# Patient Record
Sex: MALE | Race: WHITE | NOT HISPANIC OR LATINO | Employment: OTHER | URBAN - NONMETROPOLITAN AREA
[De-identification: names, ages, dates, MRNs, and addresses within clinical notes are randomized per-mention and may not be internally consistent; named-entity substitution may affect disease eponyms.]

---

## 2019-11-29 ENCOUNTER — APPOINTMENT (OUTPATIENT)
Dept: GENERAL RADIOLOGY | Facility: HOSPITAL | Age: 54
End: 2019-11-29

## 2019-11-29 ENCOUNTER — HOSPITAL ENCOUNTER (INPATIENT)
Facility: HOSPITAL | Age: 54
LOS: 2 days | Discharge: HOME | End: 2019-12-01
Attending: EMERGENCY MEDICINE | Admitting: INTERNAL MEDICINE

## 2019-11-29 DIAGNOSIS — I21.3 ST ELEVATION MYOCARDIAL INFARCTION (STEMI), UNSPECIFIED ARTERY (HCC): Primary | ICD-10-CM

## 2019-11-29 DIAGNOSIS — I21.02 ST ELEVATION MYOCARDIAL INFARCTION INVOLVING LEFT ANTERIOR DESCENDING (LAD) CORONARY ARTERY (HCC): ICD-10-CM

## 2019-11-29 LAB
ACT BLD: 122 SECONDS (ref 82–152)
ACT BLD: 78 SECONDS (ref 82–152)
ALBUMIN SERPL-MCNC: 4.6 G/DL (ref 3.5–5.2)
ALBUMIN/GLOB SERPL: 1.4 G/DL
ALP SERPL-CCNC: 101 U/L (ref 39–117)
ALT SERPL W P-5'-P-CCNC: 13 U/L (ref 1–41)
ANION GAP SERPL CALCULATED.3IONS-SCNC: 11 MMOL/L (ref 5–15)
ANION GAP SERPL CALCULATED.3IONS-SCNC: 16 MMOL/L (ref 5–15)
AST SERPL-CCNC: 21 U/L (ref 1–40)
BASOPHILS # BLD AUTO: 0.06 10*3/MM3 (ref 0–0.2)
BASOPHILS NFR BLD AUTO: 0.6 % (ref 0–1.5)
BILIRUB SERPL-MCNC: 0.6 MG/DL (ref 0.2–1.2)
BUN BLD-MCNC: 12 MG/DL (ref 6–20)
BUN BLD-MCNC: 13 MG/DL (ref 6–20)
BUN/CREAT SERPL: 11.3 (ref 7–25)
BUN/CREAT SERPL: 12.7 (ref 7–25)
CALCIUM SPEC-SCNC: 10 MG/DL (ref 8.6–10.5)
CALCIUM SPEC-SCNC: 9.3 MG/DL (ref 8.6–10.5)
CHLORIDE SERPL-SCNC: 98 MMOL/L (ref 98–107)
CHLORIDE SERPL-SCNC: 99 MMOL/L (ref 98–107)
CHOLEST SERPL-MCNC: 205 MG/DL (ref 0–200)
CO2 SERPL-SCNC: 24 MMOL/L (ref 22–29)
CO2 SERPL-SCNC: 27 MMOL/L (ref 22–29)
CREAT BLD-MCNC: 1.02 MG/DL (ref 0.76–1.27)
CREAT BLD-MCNC: 1.06 MG/DL (ref 0.76–1.27)
DEPRECATED RDW RBC AUTO: 39.6 FL (ref 37–54)
DEPRECATED RDW RBC AUTO: 40 FL (ref 37–54)
EOSINOPHIL # BLD AUTO: 0.24 10*3/MM3 (ref 0–0.4)
EOSINOPHIL NFR BLD AUTO: 2.3 % (ref 0.3–6.2)
ERYTHROCYTE [DISTWIDTH] IN BLOOD BY AUTOMATED COUNT: 12 % (ref 12.3–15.4)
ERYTHROCYTE [DISTWIDTH] IN BLOOD BY AUTOMATED COUNT: 12.1 % (ref 12.3–15.4)
GFR SERPL CREATININE-BSD FRML MDRD: 73 ML/MIN/1.73
GFR SERPL CREATININE-BSD FRML MDRD: 76 ML/MIN/1.73
GLOBULIN UR ELPH-MCNC: 3.3 GM/DL
GLUCOSE BLD-MCNC: 102 MG/DL (ref 65–99)
GLUCOSE BLD-MCNC: 110 MG/DL (ref 65–99)
HBA1C MFR BLD: 5.6 % (ref 4.8–5.6)
HCT VFR BLD AUTO: 41 % (ref 37.5–51)
HCT VFR BLD AUTO: 45.7 % (ref 37.5–51)
HDLC SERPL-MCNC: 51 MG/DL (ref 40–60)
HGB BLD-MCNC: 14.2 G/DL (ref 13–17.7)
HGB BLD-MCNC: 15.9 G/DL (ref 13–17.7)
HOLD SPECIMEN: NORMAL
HOLD SPECIMEN: NORMAL
IMM GRANULOCYTES # BLD AUTO: 0.04 10*3/MM3 (ref 0–0.05)
IMM GRANULOCYTES NFR BLD AUTO: 0.4 % (ref 0–0.5)
LDLC SERPL CALC-MCNC: 139 MG/DL (ref 0–100)
LDLC/HDLC SERPL: 2.73 {RATIO}
LYMPHOCYTES # BLD AUTO: 4.96 10*3/MM3 (ref 0.7–3.1)
LYMPHOCYTES NFR BLD AUTO: 47.3 % (ref 19.6–45.3)
MCH RBC QN AUTO: 31 PG (ref 26.6–33)
MCH RBC QN AUTO: 31.2 PG (ref 26.6–33)
MCHC RBC AUTO-ENTMCNC: 34.6 G/DL (ref 31.5–35.7)
MCHC RBC AUTO-ENTMCNC: 34.8 G/DL (ref 31.5–35.7)
MCV RBC AUTO: 89.5 FL (ref 79–97)
MCV RBC AUTO: 89.8 FL (ref 79–97)
MONOCYTES # BLD AUTO: 0.87 10*3/MM3 (ref 0.1–0.9)
MONOCYTES NFR BLD AUTO: 8.3 % (ref 5–12)
NEUTROPHILS # BLD AUTO: 4.32 10*3/MM3 (ref 1.7–7)
NEUTROPHILS NFR BLD AUTO: 41.1 % (ref 42.7–76)
NRBC BLD AUTO-RTO: 0 /100 WBC (ref 0–0.2)
NT-PROBNP SERPL-MCNC: 32.3 PG/ML (ref 5–900)
PLATELET # BLD AUTO: 213 10*3/MM3 (ref 140–450)
PLATELET # BLD AUTO: 261 10*3/MM3 (ref 140–450)
PMV BLD AUTO: 9.4 FL (ref 6–12)
PMV BLD AUTO: 9.4 FL (ref 6–12)
POTASSIUM BLD-SCNC: 4 MMOL/L (ref 3.5–5.2)
POTASSIUM BLD-SCNC: 4.1 MMOL/L (ref 3.5–5.2)
PROT SERPL-MCNC: 7.9 G/DL (ref 6–8.5)
RBC # BLD AUTO: 4.58 10*6/MM3 (ref 4.14–5.8)
RBC # BLD AUTO: 5.09 10*6/MM3 (ref 4.14–5.8)
SODIUM BLD-SCNC: 136 MMOL/L (ref 136–145)
SODIUM BLD-SCNC: 139 MMOL/L (ref 136–145)
TRIGL SERPL-MCNC: 73 MG/DL (ref 0–150)
TROPONIN T SERPL-MCNC: 0.18 NG/ML (ref 0–0.03)
TROPONIN T SERPL-MCNC: <0.01 NG/ML (ref 0–0.03)
VLDLC SERPL-MCNC: 14.6 MG/DL
WBC NRBC COR # BLD: 10.49 10*3/MM3 (ref 3.4–10.8)
WBC NRBC COR # BLD: 12.33 10*3/MM3 (ref 3.4–10.8)
WHOLE BLOOD HOLD SPECIMEN: NORMAL
WHOLE BLOOD HOLD SPECIMEN: NORMAL

## 2019-11-29 PROCEDURE — B2111ZZ FLUOROSCOPY OF MULTIPLE CORONARY ARTERIES USING LOW OSMOLAR CONTRAST: ICD-10-PCS | Performed by: INTERNAL MEDICINE

## 2019-11-29 PROCEDURE — 99284 EMERGENCY DEPT VISIT MOD MDM: CPT

## 2019-11-29 PROCEDURE — 93010 ELECTROCARDIOGRAM REPORT: CPT | Performed by: INTERNAL MEDICINE

## 2019-11-29 PROCEDURE — 85025 COMPLETE CBC W/AUTO DIFF WBC: CPT | Performed by: EMERGENCY MEDICINE

## 2019-11-29 PROCEDURE — 92941 PRQ TRLML REVSC TOT OCCL AMI: CPT | Performed by: INTERNAL MEDICINE

## 2019-11-29 PROCEDURE — C9606 PERC D-E COR REVASC W AMI S: HCPCS | Performed by: INTERNAL MEDICINE

## 2019-11-29 PROCEDURE — 36415 COLL VENOUS BLD VENIPUNCTURE: CPT | Performed by: EMERGENCY MEDICINE

## 2019-11-29 PROCEDURE — 0 IOPAMIDOL PER 1 ML: Performed by: INTERNAL MEDICINE

## 2019-11-29 PROCEDURE — 84484 ASSAY OF TROPONIN QUANT: CPT | Performed by: EMERGENCY MEDICINE

## 2019-11-29 PROCEDURE — 83036 HEMOGLOBIN GLYCOSYLATED A1C: CPT | Performed by: INTERNAL MEDICINE

## 2019-11-29 PROCEDURE — C1769 GUIDE WIRE: HCPCS | Performed by: INTERNAL MEDICINE

## 2019-11-29 PROCEDURE — 25010000002 BIVALIRUDIN TRIFLUOROACETATE 250 MG RECONSTITUTED SOLUTION 1 EACH VIAL: Performed by: INTERNAL MEDICINE

## 2019-11-29 PROCEDURE — 4A023N7 MEASUREMENT OF CARDIAC SAMPLING AND PRESSURE, LEFT HEART, PERCUTANEOUS APPROACH: ICD-10-PCS | Performed by: INTERNAL MEDICINE

## 2019-11-29 PROCEDURE — 71045 X-RAY EXAM CHEST 1 VIEW: CPT

## 2019-11-29 PROCEDURE — C1874 STENT, COATED/COV W/DEL SYS: HCPCS | Performed by: INTERNAL MEDICINE

## 2019-11-29 PROCEDURE — 80053 COMPREHEN METABOLIC PANEL: CPT | Performed by: EMERGENCY MEDICINE

## 2019-11-29 PROCEDURE — 80061 LIPID PANEL: CPT | Performed by: INTERNAL MEDICINE

## 2019-11-29 PROCEDURE — 93458 L HRT ARTERY/VENTRICLE ANGIO: CPT | Performed by: INTERNAL MEDICINE

## 2019-11-29 PROCEDURE — 027034Z DILATION OF CORONARY ARTERY, ONE ARTERY WITH DRUG-ELUTING INTRALUMINAL DEVICE, PERCUTANEOUS APPROACH: ICD-10-PCS | Performed by: INTERNAL MEDICINE

## 2019-11-29 PROCEDURE — 83880 ASSAY OF NATRIURETIC PEPTIDE: CPT | Performed by: EMERGENCY MEDICINE

## 2019-11-29 PROCEDURE — 85347 COAGULATION TIME ACTIVATED: CPT

## 2019-11-29 PROCEDURE — 85027 COMPLETE CBC AUTOMATED: CPT | Performed by: INTERNAL MEDICINE

## 2019-11-29 PROCEDURE — 93005 ELECTROCARDIOGRAM TRACING: CPT | Performed by: INTERNAL MEDICINE

## 2019-11-29 PROCEDURE — C1887 CATHETER, GUIDING: HCPCS | Performed by: INTERNAL MEDICINE

## 2019-11-29 PROCEDURE — C1894 INTRO/SHEATH, NON-LASER: HCPCS | Performed by: INTERNAL MEDICINE

## 2019-11-29 PROCEDURE — 93005 ELECTROCARDIOGRAM TRACING: CPT | Performed by: EMERGENCY MEDICINE

## 2019-11-29 PROCEDURE — 99223 1ST HOSP IP/OBS HIGH 75: CPT | Performed by: INTERNAL MEDICINE

## 2019-11-29 PROCEDURE — 80048 BASIC METABOLIC PNL TOTAL CA: CPT | Performed by: INTERNAL MEDICINE

## 2019-11-29 PROCEDURE — C1725 CATH, TRANSLUMIN NON-LASER: HCPCS | Performed by: INTERNAL MEDICINE

## 2019-11-29 PROCEDURE — B2151ZZ FLUOROSCOPY OF LEFT HEART USING LOW OSMOLAR CONTRAST: ICD-10-PCS | Performed by: INTERNAL MEDICINE

## 2019-11-29 DEVICE — XIENCE SIERRA™ EVEROLIMUS ELUTING CORONARY STENT SYSTEM 3.25 MM X 33 MM / RAPID-EXCHANGE
Type: IMPLANTABLE DEVICE | Site: CORONARY | Status: FUNCTIONAL
Brand: XIENCE SIERRA™

## 2019-11-29 RX ORDER — PRASUGREL 10 MG/1
TABLET, FILM COATED ORAL AS NEEDED
Status: DISCONTINUED | OUTPATIENT
Start: 2019-11-29 | End: 2019-11-29 | Stop reason: HOSPADM

## 2019-11-29 RX ORDER — NITROGLYCERIN 20 MG/100ML
10-50 INJECTION INTRAVENOUS
Status: DISCONTINUED | OUTPATIENT
Start: 2019-11-29 | End: 2019-11-29

## 2019-11-29 RX ORDER — CARVEDILOL 3.12 MG/1
3.12 TABLET ORAL EVERY 12 HOURS SCHEDULED
Status: DISCONTINUED | OUTPATIENT
Start: 2019-11-29 | End: 2019-11-30

## 2019-11-29 RX ORDER — ONDANSETRON 2 MG/ML
4 INJECTION INTRAMUSCULAR; INTRAVENOUS EVERY 6 HOURS PRN
Status: DISCONTINUED | OUTPATIENT
Start: 2019-11-29 | End: 2019-12-01 | Stop reason: HOSPADM

## 2019-11-29 RX ORDER — ATORVASTATIN CALCIUM 40 MG/1
40 TABLET, FILM COATED ORAL NIGHTLY
Status: DISCONTINUED | OUTPATIENT
Start: 2019-11-29 | End: 2019-12-01 | Stop reason: HOSPADM

## 2019-11-29 RX ORDER — ASPIRIN 325 MG
325 TABLET ORAL ONCE
Status: COMPLETED | OUTPATIENT
Start: 2019-11-29 | End: 2019-11-29

## 2019-11-29 RX ORDER — NITROGLYCERIN 5 MG/ML
INJECTION, SOLUTION INTRAVENOUS AS NEEDED
Status: DISCONTINUED | OUTPATIENT
Start: 2019-11-29 | End: 2019-11-29 | Stop reason: HOSPADM

## 2019-11-29 RX ORDER — FAMOTIDINE 20 MG/1
20 TABLET, FILM COATED ORAL
Status: DISCONTINUED | OUTPATIENT
Start: 2019-11-29 | End: 2019-12-01

## 2019-11-29 RX ORDER — SODIUM CHLORIDE 9 MG/ML
250 INJECTION, SOLUTION INTRAVENOUS ONCE AS NEEDED
Status: DISCONTINUED | OUTPATIENT
Start: 2019-11-29 | End: 2019-12-01 | Stop reason: HOSPADM

## 2019-11-29 RX ORDER — SODIUM CHLORIDE 0.9 % (FLUSH) 0.9 %
10 SYRINGE (ML) INJECTION AS NEEDED
Status: DISCONTINUED | OUTPATIENT
Start: 2019-11-29 | End: 2019-12-01 | Stop reason: HOSPADM

## 2019-11-29 RX ORDER — ASPIRIN 81 MG/1
81 TABLET, CHEWABLE ORAL DAILY
Status: DISCONTINUED | OUTPATIENT
Start: 2019-11-30 | End: 2019-12-01 | Stop reason: HOSPADM

## 2019-11-29 RX ORDER — LOSARTAN POTASSIUM 25 MG/1
25 TABLET ORAL DAILY
Status: DISCONTINUED | OUTPATIENT
Start: 2019-11-29 | End: 2019-12-01

## 2019-11-29 RX ORDER — PRASUGREL 10 MG/1
10 TABLET, FILM COATED ORAL DAILY
Status: DISCONTINUED | OUTPATIENT
Start: 2019-11-30 | End: 2019-12-01 | Stop reason: HOSPADM

## 2019-11-29 RX ORDER — SODIUM CHLORIDE 9 MG/ML
125 INJECTION, SOLUTION INTRAVENOUS CONTINUOUS
Status: ACTIVE | OUTPATIENT
Start: 2019-11-29 | End: 2019-11-29

## 2019-11-29 RX ORDER — ONDANSETRON 4 MG/1
4 TABLET, FILM COATED ORAL EVERY 6 HOURS PRN
Status: DISCONTINUED | OUTPATIENT
Start: 2019-11-29 | End: 2019-12-01 | Stop reason: HOSPADM

## 2019-11-29 RX ORDER — LIDOCAINE HYDROCHLORIDE 20 MG/ML
INJECTION, SOLUTION INFILTRATION; PERINEURAL AS NEEDED
Status: DISCONTINUED | OUTPATIENT
Start: 2019-11-29 | End: 2019-11-29 | Stop reason: HOSPADM

## 2019-11-29 RX ORDER — ACETAMINOPHEN 325 MG/1
650 TABLET ORAL EVERY 4 HOURS PRN
Status: DISCONTINUED | OUTPATIENT
Start: 2019-11-29 | End: 2019-12-01 | Stop reason: HOSPADM

## 2019-11-29 RX ADMIN — FAMOTIDINE 20 MG: 20 TABLET ORAL at 18:04

## 2019-11-29 RX ADMIN — Medication 10 ML: at 03:51

## 2019-11-29 RX ADMIN — CARVEDILOL 3.12 MG: 3.12 TABLET, FILM COATED ORAL at 20:39

## 2019-11-29 RX ADMIN — SODIUM CHLORIDE 125 ML/HR: 9 INJECTION, SOLUTION INTRAVENOUS at 06:11

## 2019-11-29 RX ADMIN — CARVEDILOL 3.12 MG: 3.12 TABLET, FILM COATED ORAL at 08:06

## 2019-11-29 RX ADMIN — FAMOTIDINE 20 MG: 20 TABLET ORAL at 06:33

## 2019-11-29 RX ADMIN — ASPIRIN 325 MG: 325 TABLET, COATED ORAL at 03:50

## 2019-11-29 RX ADMIN — LOSARTAN POTASSIUM 25 MG: 25 TABLET, FILM COATED ORAL at 08:06

## 2019-11-29 RX ADMIN — NITROGLYCERIN 10 MCG/MIN: 20 INJECTION INTRAVENOUS at 04:06

## 2019-11-29 RX ADMIN — SODIUM CHLORIDE 1000 ML: 900 INJECTION, SOLUTION INTRAVENOUS at 03:52

## 2019-11-29 RX ADMIN — Medication 12.5 MG: at 11:47

## 2019-11-29 RX ADMIN — ATORVASTATIN CALCIUM 40 MG: 40 TABLET, FILM COATED ORAL at 20:39

## 2019-11-30 ENCOUNTER — APPOINTMENT (OUTPATIENT)
Dept: CARDIOLOGY | Facility: HOSPITAL | Age: 54
End: 2019-11-30

## 2019-11-30 LAB
BH CV ECHO MEAS - ACS: 2.3 CM
BH CV ECHO MEAS - AO MAX PG (FULL): 1.3 MMHG
BH CV ECHO MEAS - AO MAX PG: 3.6 MMHG
BH CV ECHO MEAS - AO MEAN PG (FULL): 0 MMHG
BH CV ECHO MEAS - AO MEAN PG: 2 MMHG
BH CV ECHO MEAS - AO ROOT AREA (BSA CORRECTED): 1.8
BH CV ECHO MEAS - AO ROOT AREA: 9.1 CM^2
BH CV ECHO MEAS - AO ROOT DIAM: 3.4 CM
BH CV ECHO MEAS - AO V2 MAX: 95.4 CM/SEC
BH CV ECHO MEAS - AO V2 MEAN: 74.5 CM/SEC
BH CV ECHO MEAS - AO V2 VTI: 17.2 CM
BH CV ECHO MEAS - AVA(I,A): 3.5 CM^2
BH CV ECHO MEAS - AVA(I,D): 3.5 CM^2
BH CV ECHO MEAS - AVA(V,A): 3.3 CM^2
BH CV ECHO MEAS - AVA(V,D): 3.3 CM^2
BH CV ECHO MEAS - BSA(HAYCOCK): 1.9 M^2
BH CV ECHO MEAS - BSA: 1.9 M^2
BH CV ECHO MEAS - BZI_BMI: 23.7 KILOGRAMS/M^2
BH CV ECHO MEAS - BZI_METRIC_HEIGHT: 177.8 CM
BH CV ECHO MEAS - BZI_METRIC_WEIGHT: 74.8 KG
BH CV ECHO MEAS - EDV(CUBED): 89.3 ML
BH CV ECHO MEAS - EDV(MOD-SP2): 58.7 ML
BH CV ECHO MEAS - EDV(MOD-SP4): 78.6 ML
BH CV ECHO MEAS - EDV(TEICH): 91 ML
BH CV ECHO MEAS - EF(CUBED): 37.6 %
BH CV ECHO MEAS - EF(MOD-SP2): 36.8 %
BH CV ECHO MEAS - EF(MOD-SP4): 37.8 %
BH CV ECHO MEAS - EF(TEICH): 31.1 %
BH CV ECHO MEAS - ESV(CUBED): 55.7 ML
BH CV ECHO MEAS - ESV(MOD-SP2): 37.1 ML
BH CV ECHO MEAS - ESV(MOD-SP4): 48.9 ML
BH CV ECHO MEAS - ESV(TEICH): 62.7 ML
BH CV ECHO MEAS - FS: 14.5 %
BH CV ECHO MEAS - IVS/LVPW: 0.95
BH CV ECHO MEAS - IVSD: 0.9 CM
BH CV ECHO MEAS - LA DIMENSION: 2.9 CM
BH CV ECHO MEAS - LA/AO: 0.85
BH CV ECHO MEAS - LV DIASTOLIC VOL/BSA (35-75): 40.9 ML/M^2
BH CV ECHO MEAS - LV MASS(C)D: 135.9 GRAMS
BH CV ECHO MEAS - LV MASS(C)DI: 70.7 GRAMS/M^2
BH CV ECHO MEAS - LV MAX PG: 2.4 MMHG
BH CV ECHO MEAS - LV MEAN PG: 2 MMHG
BH CV ECHO MEAS - LV SYSTOLIC VOL/BSA (12-30): 25.4 ML/M^2
BH CV ECHO MEAS - LV V1 MAX: 76.7 CM/SEC
BH CV ECHO MEAS - LV V1 MEAN: 57.2 CM/SEC
BH CV ECHO MEAS - LV V1 VTI: 14.6 CM
BH CV ECHO MEAS - LVIDD: 4.5 CM
BH CV ECHO MEAS - LVIDS: 3.8 CM
BH CV ECHO MEAS - LVLD AP2: 7.3 CM
BH CV ECHO MEAS - LVLD AP4: 7.3 CM
BH CV ECHO MEAS - LVLS AP2: 6.8 CM
BH CV ECHO MEAS - LVLS AP4: 6.9 CM
BH CV ECHO MEAS - LVOT AREA (M): 4.2 CM^2
BH CV ECHO MEAS - LVOT AREA: 4.2 CM^2
BH CV ECHO MEAS - LVOT DIAM: 2.3 CM
BH CV ECHO MEAS - LVPWD: 0.95 CM
BH CV ECHO MEAS - MV A MAX VEL: 46.3 CM/SEC
BH CV ECHO MEAS - MV DEC SLOPE: 326 CM/SEC^2
BH CV ECHO MEAS - MV E MAX VEL: 47.3 CM/SEC
BH CV ECHO MEAS - MV E/A: 1
BH CV ECHO MEAS - MV P1/2T MAX VEL: 65.8 CM/SEC
BH CV ECHO MEAS - MV P1/2T: 59.1 MSEC
BH CV ECHO MEAS - MVA P1/2T LCG: 3.3 CM^2
BH CV ECHO MEAS - MVA(P1/2T): 3.7 CM^2
BH CV ECHO MEAS - PA MAX PG (FULL): 0.77 MMHG
BH CV ECHO MEAS - PA MAX PG: 2.9 MMHG
BH CV ECHO MEAS - PA V2 MAX: 85.3 CM/SEC
BH CV ECHO MEAS - RAP SYSTOLE: 5 MMHG
BH CV ECHO MEAS - RV MAX PG: 2.1 MMHG
BH CV ECHO MEAS - RV MEAN PG: 1 MMHG
BH CV ECHO MEAS - RV V1 MAX: 73.2 CM/SEC
BH CV ECHO MEAS - RV V1 MEAN: 51.7 CM/SEC
BH CV ECHO MEAS - RV V1 VTI: 13.4 CM
BH CV ECHO MEAS - RVDD: 2.9 CM
BH CV ECHO MEAS - RVSP: 25.3 MMHG
BH CV ECHO MEAS - SI(AO): 81.2 ML/M^2
BH CV ECHO MEAS - SI(CUBED): 17.5 ML/M^2
BH CV ECHO MEAS - SI(LVOT): 31.5 ML/M^2
BH CV ECHO MEAS - SI(MOD-SP2): 11.2 ML/M^2
BH CV ECHO MEAS - SI(MOD-SP4): 15.4 ML/M^2
BH CV ECHO MEAS - SI(TEICH): 14.7 ML/M^2
BH CV ECHO MEAS - SV(AO): 156.2 ML
BH CV ECHO MEAS - SV(CUBED): 33.6 ML
BH CV ECHO MEAS - SV(LVOT): 60.7 ML
BH CV ECHO MEAS - SV(MOD-SP2): 21.6 ML
BH CV ECHO MEAS - SV(MOD-SP4): 29.7 ML
BH CV ECHO MEAS - SV(TEICH): 28.3 ML
BH CV ECHO MEAS - TR MAX VEL: 225 CM/SEC
LV EF 2D ECHO EST: 35 %
MAXIMAL PREDICTED HEART RATE: 166 BPM
STRESS TARGET HR: 141 BPM
TROPONIN T SERPL-MCNC: 0.36 NG/ML (ref 0–0.03)

## 2019-11-30 PROCEDURE — 93306 TTE W/DOPPLER COMPLETE: CPT | Performed by: INTERNAL MEDICINE

## 2019-11-30 PROCEDURE — 84484 ASSAY OF TROPONIN QUANT: CPT | Performed by: INTERNAL MEDICINE

## 2019-11-30 PROCEDURE — 99232 SBSQ HOSP IP/OBS MODERATE 35: CPT | Performed by: INTERNAL MEDICINE

## 2019-11-30 PROCEDURE — 93306 TTE W/DOPPLER COMPLETE: CPT

## 2019-11-30 RX ORDER — CARVEDILOL 3.12 MG/1
3.12 TABLET ORAL ONCE
Status: COMPLETED | OUTPATIENT
Start: 2019-11-30 | End: 2019-11-30

## 2019-11-30 RX ORDER — CARVEDILOL 6.25 MG/1
6.25 TABLET ORAL EVERY 12 HOURS SCHEDULED
Status: DISCONTINUED | OUTPATIENT
Start: 2019-11-30 | End: 2019-12-01

## 2019-11-30 RX ADMIN — FAMOTIDINE 20 MG: 20 TABLET ORAL at 08:09

## 2019-11-30 RX ADMIN — PRASUGREL 10 MG: 10 TABLET, FILM COATED ORAL at 08:09

## 2019-11-30 RX ADMIN — ASPIRIN 81 MG 81 MG: 81 TABLET ORAL at 08:09

## 2019-11-30 RX ADMIN — CARVEDILOL 6.25 MG: 6.25 TABLET, FILM COATED ORAL at 20:30

## 2019-11-30 RX ADMIN — LOSARTAN POTASSIUM 25 MG: 25 TABLET, FILM COATED ORAL at 08:09

## 2019-11-30 RX ADMIN — Medication 12.5 MG: at 08:09

## 2019-11-30 RX ADMIN — ATORVASTATIN CALCIUM 40 MG: 40 TABLET, FILM COATED ORAL at 20:30

## 2019-11-30 RX ADMIN — CARVEDILOL 3.12 MG: 3.12 TABLET, FILM COATED ORAL at 08:09

## 2019-11-30 RX ADMIN — CARVEDILOL 3.12 MG: 3.12 TABLET, FILM COATED ORAL at 11:49

## 2019-12-01 VITALS
TEMPERATURE: 98.5 F | RESPIRATION RATE: 18 BRPM | WEIGHT: 167.1 LBS | BODY MASS INDEX: 23.92 KG/M2 | SYSTOLIC BLOOD PRESSURE: 110 MMHG | HEART RATE: 78 BPM | OXYGEN SATURATION: 96 % | HEIGHT: 70 IN | DIASTOLIC BLOOD PRESSURE: 59 MMHG

## 2019-12-01 PROCEDURE — 99239 HOSP IP/OBS DSCHRG MGMT >30: CPT | Performed by: INTERNAL MEDICINE

## 2019-12-01 RX ORDER — DIGOXIN 125 MCG
125 TABLET ORAL
Status: DISCONTINUED | OUTPATIENT
Start: 2019-12-01 | End: 2019-12-01

## 2019-12-01 RX ORDER — LOSARTAN POTASSIUM 25 MG/1
25 TABLET ORAL
Status: DISCONTINUED | OUTPATIENT
Start: 2019-12-02 | End: 2019-12-01 | Stop reason: HOSPADM

## 2019-12-01 RX ORDER — CARVEDILOL 3.12 MG/1
3.12 TABLET ORAL EVERY 12 HOURS SCHEDULED
Status: DISCONTINUED | OUTPATIENT
Start: 2019-12-01 | End: 2019-12-01 | Stop reason: HOSPADM

## 2019-12-01 RX ORDER — SPIRONOLACTONE 25 MG/1
12.5 TABLET ORAL DAILY
Qty: 30 TABLET | Refills: 6 | Status: SHIPPED | OUTPATIENT
Start: 2019-12-02

## 2019-12-01 RX ORDER — ASPIRIN 81 MG/1
81 TABLET ORAL DAILY
Qty: 30 TABLET | Refills: 6 | Status: SHIPPED | OUTPATIENT
Start: 2019-12-01

## 2019-12-01 RX ORDER — CARVEDILOL 3.12 MG/1
3.12 TABLET ORAL EVERY 12 HOURS SCHEDULED
Status: DISCONTINUED | OUTPATIENT
Start: 2019-12-01 | End: 2019-12-01

## 2019-12-01 RX ORDER — LOSARTAN POTASSIUM 25 MG/1
25 TABLET ORAL
Qty: 30 TABLET | Refills: 6 | Status: SHIPPED | OUTPATIENT
Start: 2019-12-02

## 2019-12-01 RX ORDER — PRASUGREL 10 MG/1
10 TABLET, FILM COATED ORAL DAILY
Qty: 30 TABLET | Refills: 6 | Status: SHIPPED | OUTPATIENT
Start: 2019-12-02

## 2019-12-01 RX ORDER — CARVEDILOL 3.12 MG/1
3.12 TABLET ORAL EVERY 12 HOURS SCHEDULED
Qty: 60 TABLET | Refills: 6 | Status: SHIPPED | OUTPATIENT
Start: 2019-12-01

## 2019-12-01 RX ORDER — ATORVASTATIN CALCIUM 40 MG/1
40 TABLET, FILM COATED ORAL NIGHTLY
Qty: 30 TABLET | Refills: 6 | Status: SHIPPED | OUTPATIENT
Start: 2019-12-01

## 2019-12-01 RX ADMIN — Medication 12.5 MG: at 08:01

## 2019-12-01 RX ADMIN — FAMOTIDINE 20 MG: 20 TABLET ORAL at 08:01

## 2019-12-01 RX ADMIN — ASPIRIN 81 MG 81 MG: 81 TABLET ORAL at 08:01

## 2019-12-01 RX ADMIN — CARVEDILOL 6.25 MG: 6.25 TABLET, FILM COATED ORAL at 08:01

## 2019-12-01 RX ADMIN — LOSARTAN POTASSIUM 25 MG: 25 TABLET, FILM COATED ORAL at 08:01

## 2019-12-01 RX ADMIN — PRASUGREL 10 MG: 10 TABLET, FILM COATED ORAL at 08:01

## 2019-12-01 NOTE — DISCHARGE SUMMARY
Date of Discharge:  12/1/2019    Discharge Diagnosis:   1.Acute anterolateral myocardial infarction  2.  Tobacco abuse  3.  Hyperlipidemia    Presenting Problem/History of Present Illness  ST elevation myocardial infarction (STEMI), unspecified artery (CMS/HCC) [I21.3]  ST elevation myocardial infarction (STEMI), unspecified artery (CMS/HCC) [I21.3]     Hospital Course  Geovanni Ceballos is a 54-year-old  male with risk factors for coronary artery disease being tobacco abuse and positive family history for CAD who presented with prolonged chest pain which started about 3 AM on 11/29/19 (>1hr ago), with EKG evidence of acute anterolateral myocardial injury/infarction.  Hyperacute T waves were noted in the anterolateral leads with reciprocal ST depression in the inferior leads.  The patient was visiting his family here in [place], from [place], [place].  Emergent cardiac catheterization was recommended.     Procedures Performed:  1.  Coronary angiogram  2.  PCI to 100% occlusion of the proximal/mid left anterior descending coronary artery  3.  Left heart catheterization and left ventriculogram     Procedure Details  The risks, benefits, complications, treatment options, and expected outcomes were discussed with the patient. The patient and/or family concurred with the proposed plan, giving informed consent.  He was prepped and draped in the usual manner. Using the modified Seldinger access technique, a 6 Afghan sheath was placed in the right femoral artery under ultrasound guidance.  Diagnostic catheterization was performed using 6 Afghan F L4, FR4 and pigtail catheters.  Standard images of the coronary arteries were first obtained followed by PCI to left anterior descending coronary artery. Left heart catheterization and left ventriculogram was performed in the LOTT view at 30°.     Vascular Access: Puncture site was in the common femoral artery just above the bifurcation     Findings:  Left main coronary  artery: This is a large patent vessel which divided into a left anterior descending coronary artery and a dominant left circumflex coronary artery  Left anterior descending coronary artery: This is a large vessel which was 100% occluded in the proximal/mid segment just at the point of origin of septal  and a small diagonal 1 coronary artery.  Filling defects consistent with thrombus was noted.  Flow was ALEXIS 0  Left circumflex coronary artery: This is a large dominant vessel which gave rise to multiple obtuse marginal branches before continuing as a large PDA.  OM 3 was a medium sized vessel with proximal 20% lesion.  All other obtuse marginals were tortuous patent vessels with no lesions.  PDA was patent.  Right coronary artery: This was a medium size nondominant vessel.     Interventions: PCI to 100% occlusion of the proximal/mid left anterior descending coronary artery which was the infarct artery     The patient was given Integrilin bolus and Angiomax as per protocol and a 6 Bel[place]ian XB LAD 3.5 guide was advanced to the ostium of the left main coronary artery.  A 0.014 BMW wire was then advanced into the LAD past the area of occlusion to the distal part of the vessel.  Wire advancement resulted in ALEXIS II flow through the vessel.  The lesion was predilated using a 2.5 x 20 mm Trek balloon x 2 which resulted in ALEXIS-3 flow through the vessel.  A long area of diffuse disease was noted in the proximal and mid LAD.  A 3.25 x 38 mm Xience Jeannine stent was appropriately positioned to cover the entire length of the lesion and deployed at 13 nabil.  Final result was excellent with a residual of 0% and there was ALEXIS-3 flow through the vessel.  400 mcg of intracoronary nitroglycerin was used postprocedure.  There was an eccentric 30 to 40% lesion in the mid LAD at the point of origin of distal diagonal vessel.  This will be treated medically.     Left heart catheterization and left ventriculogram: There was LV  dysfunction with anterolateral/ apical hypokinesis, with an ejection fraction of 35 to 40%.  Aortic pressure was 110/78 and left ventricular pressure was 113/12 mmHg     Impression: 100% occlusion of the left anterior descending coronary artery in the proximal/mid segment and this was the infarct vessel.  Successful PCI with placement of 3.25 x 38 mm Xience Jeannine stent and reduction stenosis to 0%.  Minor plaques noted in the left circumflex system which was a large dominant vessel and nondominant RCA was patent.  LV dysfunction with an ejection fraction of 35 to 40% with anterolateral/apical hypokinesis.    Echocardiogram performed showed:    · Estimated EF = 35%.  · Left ventricular diastolic dysfunction (grade I) consistent with impaired relaxation.  · Estimated EF appears to be in the range of 31 - 35%. Estimated EF = 35%. Normal left ventricular cavity size and wall thickness noted. Sever hypokinesis of septum, apex and anterior wall Left ventricular diastolic dysfunction is noted (grade I) consistent with impaired relaxation    The patient had an uneventful post procedure course and had no recurrence of chest pain.  He was ambulating well without problems.  Guideline directed medical therapy was initiated but only low doses beta-blockers, ARB could be administered secondary to low normal blood pressure.  Tobacco cessation was stressed.  Patient was advised on appropriate diet and activity.    Procedures Performed  Procedure(s):  Left Heart Cath/ PCI to LAD  Echocardiogram       Pertinent Test Results:   RESULTS      Total Cholesterol  0 - 200 mg/dL 205 Abnormally high     Triglycerides  0 - 150 mg/dL 73    HDL Cholesterol  40 - 60 mg/dL 51    LDL Cholesterol   0 - 100 mg/dL 139 Abnormally high     VLDL Cholesterol  mg/dL 14.6    LDL/HDL Ratio 2.73      Hemoglobin A1C  4.80 - 5.60 % 5.60      Troponin T  0.000 - 0.030 ng/mL 0.365 Critically high      Glucose  65 - 99 mg/dL 102 Abnormally high     BUN  6 - 20  mg/dL 12    Creatinine  0.76 - 1.27 mg/dL 1.06    Sodium  136 - 145 mmol/L 136    Potassium  3.5 - 5.2 mmol/L 4.0    Chloride  98 - 107 mmol/L 98    CO2  22.0 - 29.0 mmol/L 27.0    Calcium  8.6 - 10.5 mg/dL 9.3    eGFR Non African Amer  >60 mL/min/1.73 73    BUN/Creatinine Ratio  7.0 - 25.0 11.3    Anion Gap  5.0 - 15.0 mmol/L 11.0      WBC  3.40 - 10.80 10*3/mm3 10.49    RBC  4.14 - 5.80 10*6/mm3 5.09    Hemoglobin  13.0 - 17.7 g/dL 15.9    Hematocrit  37.5 - 51.0 % 45.7    MCV  79.0 - 97.0 fL 89.8    MCH  26.6 - 33.0 pg 31.2    MCHC  31.5 - 35.7 g/dL 34.8    RDW  12.3 - 15.4 % 12.1 Abnormally low     RDW-SD  37.0 - 54.0 fl 40.0    MPV  6.0 - 12.0 fL 9.4    Platelets  140 - 450 10*3/mm3 261    Neutrophil %  42.7 - 76.0 % 41.1 Abnormally low     Lymphocyte %  19.6 - 45.3 % 47.3 Abnormally high     Monocyte %  5.0 - 12.0 % 8.3    Eosinophil %  0.3 - 6.2 % 2.3    Basophil %  0.0 - 1.5 % 0.6    Immature Grans %  0.0 - 0.5 % 0.4    Neutrophils, Absolute  1.70 - 7.00 10*3/mm3 4.32    Lymphocytes, Absolute  0.70 - 3.10 10*3/mm3 4.96 Abnormally high     Monocytes, Absolute  0.10 - 0.90 10*3/mm3 0.87    Eosinophils, Absolute  0.00 - 0.40 10*3/mm3 0.24    Basophils, Absolute  0.00 - 0.20 10*3/mm3 0.06    Immature Grans, Absolute  0.00 - 0.05 10*3/mm3 0.04    nRBC  0.0 - 0.2 /100 WBC 0.0        Condition on Discharge: Stable    Vital Signs  Temp:  [97.6 °F (36.4 °C)-98.9 °F (37.2 °C)] 97.6 °F (36.4 °C)  Heart Rate:  [71-94] 72  Resp:  [18-19] 18  BP: ()/(54-77) 108/70    Physical Exam:  Constitutional: Alert and oriented, in no acute distress.     Cardiovascular: Regular rhythm, S1 and S2 normal, no S3 or S4. Apical impulse not displaced. No murmurs, gallops, or rubs detected.     Pulmonary/Chest: Chest: normal symmetry, no tenderness to palpation, normal respiratory excursion, no intercostal retraction, no use of accessory muscles.            Pulmonary: Normal breath sounds. No rales or rhonchi.    Abdominal: Abdomen  soft, bowel sounds normoactive, no masses, no hepatosplenomegaly, non-tender, no bruits.     Discharge Disposition: Home.  Patient lives in [facility] [place].  Family will drive him to [facility] [place].    Home or Self Care    Discharge Medications     Discharge Medications      New Medications      Instructions Start Date   aspirin 81 MG EC tablet   81 mg, Oral, Daily      atorvastatin 40 MG tablet  Commonly known as:  LIPITOR   40 mg, Oral, Nightly      carvedilol 3.125 MG tablet  Commonly known as:  COREG   3.125 mg, Oral, Every 12 Hours Scheduled      losartan 25 MG tablet  Commonly known as:  COZAAR   25 mg, Oral, Every 24 Hours Scheduled   Start Date:  12/2/2019     prasugrel 10 MG tablet  Commonly known as:  EFFIENT   10 mg, Oral, Daily   Start Date:  12/2/2019     spironolactone 25 MG tablet  Commonly known as:  ALDACTONE   12.5 mg, Oral, Daily   Start Date:  12/2/2019            Discharge Diet: Cardiac prudent.    Activity at Discharge: Mr. Ceballos has been advised to not lift any heavy weights >20 lbs for about a week.  Patient advised to establish with cardiologist in [place], [place] in 1 to 2 weeks, who could assess him and advised him of further activities including return to work.    Follow-up Appointments  Patient advised to establish with cardiologist at [place], [place] in 1 to 2-weeks.   He would benefit from cardiac rehabilitation and has been advised about this.  This can be arranged in [facility] [place].         Li Peacock MD  12/01/19  9:33 AM    Time: Discharge 40 min

## 2019-12-01 NOTE — PLAN OF CARE
Problem: Patient Care Overview  Goal: Plan of Care Review  Outcome: Ongoing (interventions implemented as appropriate)   11/30/19 1900   Coping/Psychosocial   Plan of Care Reviewed With patient   OTHER   Outcome Summary transferred to 3W from CCU   Plan of Care Review   Progress improving       Problem: Cardiac: ACS (Acute Coronary Syndrome) (Adult)  Goal: Signs and Symptoms of Listed Potential Problems Will be Absent, Minimized or Managed (Cardiac: ACS)  Outcome: Ongoing (interventions implemented as appropriate)

## 2019-12-02 ENCOUNTER — READMISSION MANAGEMENT (OUTPATIENT)
Dept: CALL CENTER | Facility: HOSPITAL | Age: 54
End: 2019-12-02

## 2019-12-02 ENCOUNTER — DOCUMENTATION (OUTPATIENT)
Dept: CARDIAC REHAB | Facility: HOSPITAL | Age: 54
End: 2019-12-02

## 2019-12-02 NOTE — PROGRESS NOTES
Cardiac Rehab Eval: Pt is from [place], [place] and was here visiting family but has returned to [place]. I called and spoke to pt and advised him that we could fax his records to a cardiac rehab near him if needed. Pt states he is scheduled for a follow up with a cardiologist in [place] (arranged by Dr Peacock) and would discuss cardiac rehab options in his area. Pt was given our phone number if he needs further assistance; pt verbalized.

## 2019-12-02 NOTE — OUTREACH NOTE
Prep Survey      Responses   Facility patient discharged from?  [facility]   Is patient eligible?  Yes   Discharge diagnosis  STEMI, Heart cath & stent   Does the patient have one of the following disease processes/diagnoses(primary or secondary)?  Acute MI (STEMI,NSTEMI)   Does the patient have Home health ordered?  No   Is there a DME ordered?  No   Comments regarding appointments  establish with cardiologist in [place] in 1-2 weeks   General alerts for this patient  pt lives in [place]   Prep survey completed?  Yes          Autumn Pino RN

## 2019-12-04 ENCOUNTER — READMISSION MANAGEMENT (OUTPATIENT)
Dept: CALL CENTER | Facility: HOSPITAL | Age: 54
End: 2019-12-04

## 2019-12-04 NOTE — OUTREACH NOTE
AMI Week 1 Survey      Responses   Facility patient discharged from?  [facility]   Does the patient have one of the following disease processes/diagnoses(primary or secondary)?  Acute MI (STEMI,NSTEMI)   Is there a successful TCM telephone encounter documented?  No   Week 1 attempt successful?  Yes   Call start time  0944   Revoke  Decline to participate   Call end time  0944          Riana Lu RN

## (undated) DEVICE — A2000 MULTI-USE SYRINGE KIT, P/N 701277-003KIT CONTENTS: 100ML CONTRAST RESERVOIR AND TUBING WITH CONTRAST SPIKE AND CLAMP: Brand: A2000 MULTI-USE SYRINGE KIT

## (undated) DEVICE — DEV INFL MONARCH 20ML

## (undated) DEVICE — MODEL BT2000 P/N 700287-012KIT CONTENTS: MANIFOLD WITH SALINE AND CONTRAST PORTS, SALINE TUBING WITH SPIKE AND HAND SYRINGE, TRANSDUCER: Brand: BT2000 AUTOMATED MANIFOLD KIT

## (undated) DEVICE — TREK CORONARY DILATATION CATHETER 2.50 MM X 20 MM / RAPID-EXCHANGE: Brand: TREK

## (undated) DEVICE — GW PERIPH GUIDERIGHT STD/J/TP PTFE/PCOAT SS 0.038IN 5X150CM

## (undated) DEVICE — CATH DIAG EXPO M/ PK 6FR FL4/FR4 PIG 3PK

## (undated) DEVICE — 6F .070 XB LAD 3.5 100CM: Brand: VISTA BRITE TIP

## (undated) DEVICE — ELECTRODE,RT,STRESS,FOAM,50PK: Brand: MEDLINE

## (undated) DEVICE — PK CATH LAB 60

## (undated) DEVICE — INTRO SHEATH ART/FEM ENGAGE .038 6F12CM

## (undated) DEVICE — HI-TORQUE BALANCE MIDDLEWEIGHT GUIDE WIRE .014 J TIP 3.0 CM X 190 CM: Brand: HI-TORQUE BALANCE MIDDLEWEIGHT

## (undated) DEVICE — ARTERIAL NEEDLE: Brand: UNBRANDED

## (undated) DEVICE — COPILOT KIT INCLUDES BLEEDBACK CONTROL VALVE / GUIDE WIRE INTRODUCER / TORQUE DEVICE: Brand: ACCESSORIES